# Patient Record
Sex: FEMALE | URBAN - NONMETROPOLITAN AREA
[De-identification: names, ages, dates, MRNs, and addresses within clinical notes are randomized per-mention and may not be internally consistent; named-entity substitution may affect disease eponyms.]

---

## 2019-09-20 ENCOUNTER — DOCTOR'S OFFICE (OUTPATIENT)
Dept: URBAN - NONMETROPOLITAN AREA CLINIC 1 | Facility: CLINIC | Age: 44
Setting detail: OPHTHALMOLOGY
End: 2019-09-20
Payer: COMMERCIAL

## 2019-09-20 ENCOUNTER — OPTICAL OFFICE (OUTPATIENT)
Dept: URBAN - NONMETROPOLITAN AREA CLINIC 4 | Facility: CLINIC | Age: 44
Setting detail: OPHTHALMOLOGY
End: 2019-09-20
Payer: COMMERCIAL

## 2019-09-20 DIAGNOSIS — H52.4: ICD-10-CM

## 2019-09-20 DIAGNOSIS — H52.223: ICD-10-CM

## 2019-09-20 PROBLEM — Z01.00 GOOD OCULAR HEALTH OU: Status: ACTIVE | Noted: 2019-09-20

## 2019-09-20 PROCEDURE — 92004 COMPRE OPH EXAM NEW PT 1/>: CPT | Performed by: OPTOMETRIST

## 2019-09-20 PROCEDURE — 92310 CONTACT LENS FITTING OU: CPT | Performed by: OPTOMETRIST

## 2019-09-20 PROCEDURE — V2781 PROGRESSIVE LENS PER LENS: HCPCS | Performed by: OPTOMETRIST

## 2019-09-20 PROCEDURE — V2799 MISC VISION ITEM OR SERVICE: HCPCS | Performed by: OPTOMETRIST

## 2019-09-20 PROCEDURE — V2025 EYEGLASSES DELUX FRAMES: HCPCS | Performed by: OPTOMETRIST

## 2019-09-20 PROCEDURE — V2784 LENS POLYCARB OR EQUAL: HCPCS | Performed by: OPTOMETRIST

## 2019-09-20 PROCEDURE — V2203 LENS SPHCYL BIFOCAL 4.00D/.1: HCPCS | Performed by: OPTOMETRIST

## 2019-09-20 PROCEDURE — V2750 ANTI-REFLECTIVE COATING: HCPCS | Performed by: OPTOMETRIST

## 2019-09-20 PROCEDURE — V2020 VISION SVCS FRAMES PURCHASES: HCPCS | Performed by: OPTOMETRIST

## 2019-09-20 ASSESSMENT — REFRACTION_MANIFEST
OS_VA1: 20/20
OD_AXIS: 170
OS_ADD: +1.50
OD_VA2: 20/20
OS_VA1: 20/
OD_ADD: +1.50
OS_VA2: 20/
OD_VA2: 20/
OS_VA3: 20/
OD_VA1: 20/
OD_VA3: 20/
OS_SPHERE: -0.50
OD_VA3: 20/
OS_AXIS: 005
OU_VA: 20/
OS_VA2: 20/20
OU_VA: 20/
OD_VA1: 20/20
OS_VA3: 20/
OD_CYLINDER: -0.75
OS_CYLINDER: -0.75
OD_SPHERE: -0.50

## 2019-09-20 ASSESSMENT — CONFRONTATIONAL VISUAL FIELD TEST (CVF)
OD_FINDINGS: FULL
OS_FINDINGS: FULL

## 2019-09-20 ASSESSMENT — VISUAL ACUITY
OD_BCVA: 20/20-1
OS_BCVA: 20/20-2

## 2019-09-20 ASSESSMENT — REFRACTION_AUTOREFRACTION
OD_AXIS: 171
OD_CYLINDER: -0.75
OS_AXIS: 0.00
OS_SPHERE: -0.50
OS_CYLINDER: 0.00
OD_SPHERE: -0.50

## 2019-09-20 ASSESSMENT — REFRACTION_CURRENTRX
OS_AXIS: 168
OD_OVR_VA: 20/
OS_OVR_VA: 20/
OD_VPRISM_DIRECTION: SV
OS_SPHERE: -0.25
OD_OVR_VA: 20/
OD_CYLINDER: -0.50
OS_OVR_VA: 20/
OS_OVR_VA: 20/
OS_VPRISM_DIRECTION: SV
OD_AXIS: 001
OD_OVR_VA: 20/
OD_SPHERE: -0.25
OS_CYLINDER: -0.50

## 2019-09-20 ASSESSMENT — SPHEQUIV_DERIVED
OD_SPHEQUIV: -0.875
OD_SPHEQUIV: -0.875
OS_SPHEQUIV: -0.5
OS_SPHEQUIV: -0.875

## 2021-07-21 ENCOUNTER — HOSPITAL ENCOUNTER (EMERGENCY)
Facility: HOSPITAL | Age: 46
Discharge: HOME | End: 2021-07-21
Attending: EMERGENCY MEDICINE
Payer: COMMERCIAL

## 2021-07-21 ENCOUNTER — APPOINTMENT (EMERGENCY)
Dept: CT IMAGING | Facility: HOSPITAL | Age: 46
End: 2021-07-21
Payer: COMMERCIAL

## 2021-07-21 VITALS
HEIGHT: 65 IN | BODY MASS INDEX: 22.49 KG/M2 | SYSTOLIC BLOOD PRESSURE: 107 MMHG | WEIGHT: 135 LBS | TEMPERATURE: 97.5 F | HEART RATE: 64 BPM | RESPIRATION RATE: 16 BRPM | OXYGEN SATURATION: 100 % | DIASTOLIC BLOOD PRESSURE: 57 MMHG

## 2021-07-21 DIAGNOSIS — M62.830 MUSCLE SPASM OF BACK: Primary | ICD-10-CM

## 2021-07-21 DIAGNOSIS — N39.0 UTI (URINARY TRACT INFECTION): ICD-10-CM

## 2021-07-21 LAB
ALBUMIN SERPL BCP-MCNC: 4 G/DL (ref 3.5–5)
ALP SERPL-CCNC: 72 U/L (ref 46–116)
ALT SERPL W P-5'-P-CCNC: 26 U/L (ref 12–78)
ANION GAP SERPL CALCULATED.3IONS-SCNC: 8 MMOL/L (ref 4–13)
AST SERPL W P-5'-P-CCNC: 19 U/L (ref 5–45)
BACTERIA UR QL AUTO: ABNORMAL /HPF
BASOPHILS # BLD AUTO: 0.07 THOUSANDS/ΜL (ref 0–0.1)
BASOPHILS NFR BLD AUTO: 1 % (ref 0–1)
BILIRUB SERPL-MCNC: 0.39 MG/DL (ref 0.2–1)
BILIRUB UR QL STRIP: NEGATIVE
BUN SERPL-MCNC: 13 MG/DL (ref 5–25)
CALCIUM SERPL-MCNC: 8.9 MG/DL (ref 8.3–10.1)
CHLORIDE SERPL-SCNC: 106 MMOL/L (ref 100–108)
CLARITY UR: CLEAR
CO2 SERPL-SCNC: 28 MMOL/L (ref 21–32)
COLOR UR: YELLOW
CREAT SERPL-MCNC: 0.75 MG/DL (ref 0.6–1.3)
EOSINOPHIL # BLD AUTO: 0.4 THOUSAND/ΜL (ref 0–0.61)
EOSINOPHIL NFR BLD AUTO: 5 % (ref 0–6)
ERYTHROCYTE [DISTWIDTH] IN BLOOD BY AUTOMATED COUNT: 12.5 % (ref 11.6–15.1)
GFR SERPL CREATININE-BSD FRML MDRD: 96 ML/MIN/1.73SQ M
GLUCOSE SERPL-MCNC: 90 MG/DL (ref 65–140)
GLUCOSE UR STRIP-MCNC: NEGATIVE MG/DL
HCT VFR BLD AUTO: 42.9 % (ref 34.8–46.1)
HGB BLD-MCNC: 14.1 G/DL (ref 11.5–15.4)
HGB UR QL STRIP.AUTO: NEGATIVE
IMM GRANULOCYTES # BLD AUTO: 0.04 THOUSAND/UL (ref 0–0.2)
IMM GRANULOCYTES NFR BLD AUTO: 1 % (ref 0–2)
KETONES UR STRIP-MCNC: NEGATIVE MG/DL
LEUKOCYTE ESTERASE UR QL STRIP: ABNORMAL
LIPASE SERPL-CCNC: 165 U/L (ref 73–393)
LYMPHOCYTES # BLD AUTO: 2.38 THOUSANDS/ΜL (ref 0.6–4.47)
LYMPHOCYTES NFR BLD AUTO: 27 % (ref 14–44)
MCH RBC QN AUTO: 31.7 PG (ref 26.8–34.3)
MCHC RBC AUTO-ENTMCNC: 32.9 G/DL (ref 31.4–37.4)
MCV RBC AUTO: 96 FL (ref 82–98)
MONOCYTES # BLD AUTO: 0.58 THOUSAND/ΜL (ref 0.17–1.22)
MONOCYTES NFR BLD AUTO: 7 % (ref 4–12)
NEUTROPHILS # BLD AUTO: 5.34 THOUSANDS/ΜL (ref 1.85–7.62)
NEUTS SEG NFR BLD AUTO: 59 % (ref 43–75)
NITRITE UR QL STRIP: NEGATIVE
NON-SQ EPI CELLS URNS QL MICRO: ABNORMAL /HPF
NRBC BLD AUTO-RTO: 0 /100 WBCS
PH UR STRIP.AUTO: 5.5 [PH]
PLATELET # BLD AUTO: 243 THOUSANDS/UL (ref 149–390)
PMV BLD AUTO: 10.2 FL (ref 8.9–12.7)
POTASSIUM SERPL-SCNC: 4.3 MMOL/L (ref 3.5–5.3)
PROT SERPL-MCNC: 7.3 G/DL (ref 6.4–8.2)
PROT UR STRIP-MCNC: NEGATIVE MG/DL
RBC # BLD AUTO: 4.45 MILLION/UL (ref 3.81–5.12)
RBC #/AREA URNS AUTO: ABNORMAL /HPF
SODIUM SERPL-SCNC: 142 MMOL/L (ref 136–145)
SP GR UR STRIP.AUTO: 1.02 (ref 1–1.03)
UROBILINOGEN UR QL STRIP.AUTO: 0.2 E.U./DL
WBC # BLD AUTO: 8.81 THOUSAND/UL (ref 4.31–10.16)
WBC #/AREA URNS AUTO: ABNORMAL /HPF

## 2021-07-21 PROCEDURE — 96375 TX/PRO/DX INJ NEW DRUG ADDON: CPT

## 2021-07-21 PROCEDURE — 81001 URINALYSIS AUTO W/SCOPE: CPT | Performed by: EMERGENCY MEDICINE

## 2021-07-21 PROCEDURE — 36415 COLL VENOUS BLD VENIPUNCTURE: CPT | Performed by: EMERGENCY MEDICINE

## 2021-07-21 PROCEDURE — 74176 CT ABD & PELVIS W/O CONTRAST: CPT

## 2021-07-21 PROCEDURE — 80053 COMPREHEN METABOLIC PANEL: CPT | Performed by: EMERGENCY MEDICINE

## 2021-07-21 PROCEDURE — 83690 ASSAY OF LIPASE: CPT | Performed by: EMERGENCY MEDICINE

## 2021-07-21 PROCEDURE — 99284 EMERGENCY DEPT VISIT MOD MDM: CPT | Performed by: EMERGENCY MEDICINE

## 2021-07-21 PROCEDURE — 99284 EMERGENCY DEPT VISIT MOD MDM: CPT

## 2021-07-21 PROCEDURE — 96374 THER/PROPH/DIAG INJ IV PUSH: CPT

## 2021-07-21 PROCEDURE — 85025 COMPLETE CBC W/AUTO DIFF WBC: CPT | Performed by: EMERGENCY MEDICINE

## 2021-07-21 RX ORDER — NAPROXEN 500 MG/1
500 TABLET ORAL 2 TIMES DAILY PRN
Qty: 30 TABLET | Refills: 0 | Status: SHIPPED | OUTPATIENT
Start: 2021-07-21

## 2021-07-21 RX ORDER — CYCLOBENZAPRINE HCL 10 MG
10 TABLET ORAL 3 TIMES DAILY PRN
Qty: 20 TABLET | Refills: 0 | Status: SHIPPED | OUTPATIENT
Start: 2021-07-21

## 2021-07-21 RX ORDER — OXYCODONE HYDROCHLORIDE AND ACETAMINOPHEN 5; 325 MG/1; MG/1
1 TABLET ORAL EVERY 8 HOURS PRN
Qty: 10 TABLET | Refills: 0 | Status: SHIPPED | OUTPATIENT
Start: 2021-07-21 | End: 2021-07-31

## 2021-07-21 RX ORDER — NITROFURANTOIN 25; 75 MG/1; MG/1
100 CAPSULE ORAL 2 TIMES DAILY
Qty: 10 CAPSULE | Refills: 0 | Status: SHIPPED | OUTPATIENT
Start: 2021-07-21

## 2021-07-21 RX ORDER — KETOROLAC TROMETHAMINE 30 MG/ML
15 INJECTION, SOLUTION INTRAMUSCULAR; INTRAVENOUS ONCE
Status: COMPLETED | OUTPATIENT
Start: 2021-07-21 | End: 2021-07-21

## 2021-07-21 RX ADMIN — KETOROLAC TROMETHAMINE 15 MG: 30 INJECTION, SOLUTION INTRAMUSCULAR at 09:16

## 2021-07-21 RX ADMIN — MORPHINE SULFATE 2 MG: 2 INJECTION, SOLUTION INTRAMUSCULAR; INTRAVENOUS at 09:15

## 2021-07-21 NOTE — ED NOTES
Patient transported to [facility], [address] [facility]  07/21/21 1048

## 2021-07-21 NOTE — ED PROVIDER NOTES
History  Chief Complaint   Patient presents with    Back Pain     Right sided low back pain, started off and on for past month and become increasingly worse past 2 weeks and became unbearable past 3 days, occasional "catch her breath and feels a pain in chest when taking that breath" doesn't remember doing anything to cause muskuloskeletal injury, denies any urinary symptoms and denies radiating down legs     Patient is a pleasant 71-year-old female who presents emergency room in acute distress  She is complaining of right-sided back pain  Patient reports the back pain began about 1 month ago and has gotten progressively worse  She reports that she cannot recall any specific action or behavior that she was doing when the back pain started  Reports that initially it was mild in nature and would come and go  Over the last week or so however, it has become more severe until the last 3 days which she describes it as unbearable    She reports that it makes it difficult to catch her breath  She reports that movement hurts  She has had some mild nausea without any vomiting  She has had no actual shortness of breath  Patient does have a remote history of kidney stones  She denies any history of thoracic lumbar related spine issues  She denies any falls  Patient reports that she is unable to get comfortable at this time                History provided by:  Spouse and patient  Back Pain  Location:  Thoracic spine (Right flank)  Quality:  Aching, cramping, shooting and stabbing  Radiates to:  Does not radiate  Pain severity:  Severe  Pain is:  Same all the time  Onset quality:  Gradual  Timing:  Constant  Progression:  Worsening  Chronicity:  New  Context: not emotional stress, not falling, not jumping from heights, not lifting heavy objects, not MCA, not MVA, not occupational injury, not recent illness, not recent injury and not twisting    Relieved by:  Nothing  Worsened by:  Palpation, touching, lying down, sitting, coughing, movement and deep breathing  Associated symptoms: no abdominal pain, no bladder incontinence, no bowel incontinence, no dysuria, no fever, no leg pain and no numbness    Risk factors: no hx of cancer and no hx of osteoporosis        Prior to Admission Medications   Prescriptions Last Dose Informant Patient Reported? Taking? Multiple Vitamins-Minerals (Ultra Womens Pack) MISC 7/21/2021 at Unknown time  Yes Yes   Sig: Take by mouth      Facility-Administered Medications: None       Past Medical History:   Diagnosis Date    Kidney stone        Past Surgical History:   Procedure Laterality Date    APPENDECTOMY      CYSTOSCOPY W/ LASER LITHOTRIPSY         History reviewed  No pertinent family history  I have reviewed and agree with the history as documented  E-Cigarette/Vaping    E-Cigarette Use Never User      E-Cigarette/Vaping Substances    Nicotine No     THC No     CBD No     Flavoring No     Other No     Unknown No      Social History     Tobacco Use    Smoking status: Current Every Day Smoker     Packs/day: 1 00     Types: Cigarettes    Smokeless tobacco: Never Used   Vaping Use    Vaping Use: Never used   Substance Use Topics    Alcohol use: Yes     Comment: socially    Drug use: Never       Review of Systems   Constitutional: Negative for fever  HENT: Negative  Eyes: Negative  Respiratory: Negative  Cardiovascular: Negative  Gastrointestinal: Positive for nausea  Negative for abdominal pain, bowel incontinence, diarrhea and vomiting  Endocrine: Negative  Negative for polyuria  Genitourinary: Negative for bladder incontinence, dysuria, flank pain, frequency and urgency  Musculoskeletal: Positive for back pain  Negative for arthralgias and myalgias  Skin: Negative  Negative for pallor and rash  Allergic/Immunologic: Negative  Neurological: Negative for numbness  Hematological: Negative  Psychiatric/Behavioral: Negative      All other systems reviewed and are negative  Physical Exam  Physical Exam  Vitals and nursing note reviewed  Constitutional:       General: She is in acute distress  Abdominal:      Palpations: There is no mass  Tenderness: There is abdominal tenderness  There is right CVA tenderness  There is no left CVA tenderness or rebound  Hernia: No hernia is present  Musculoskeletal:      Cervical back: Normal       Thoracic back: Normal       Lumbar back: Normal    Psychiatric:         Mood and Affect: Mood normal          Thought Content:  Thought content normal          Judgment: Judgment normal          Vital Signs  ED Triage Vitals [07/21/21 0824]   Temperature Pulse Respirations Blood Pressure SpO2   98 3 °F (36 8 °C) 80 18 118/67 100 %      Temp Source Heart Rate Source Patient Position - Orthostatic VS BP Location FiO2 (%)   Temporal Monitor Lying Left arm --      Pain Score       7           Vitals:    07/21/21 0824 07/21/21 0830 07/21/21 0845 07/21/21 0930   BP: 118/67 99/76 98/62 110/74   Pulse: 80 69 75 73   Patient Position - Orthostatic VS: Lying Lying Lying Lying         Visual Acuity      ED Medications  Medications   morphine injection 2 mg (2 mg Intravenous Given 7/21/21 0915)   ketorolac (TORADOL) injection 15 mg (15 mg Intravenous Given 7/21/21 0916)       Diagnostic Studies  Results Reviewed     Procedure Component Value Units Date/Time    Urine Microscopic [[id number]]  (Abnormal) Collected: 07/21/21 0914    Lab Status: Final result Specimen: Urine, Clean Catch Updated: 07/21/21 1009     RBC, UA None Seen /hpf      WBC, UA 1-2 /hpf      Epithelial Cells Occasional /hpf      Bacteria, UA Moderate /hpf     UA w Reflex to Microscopic w Reflex to Culture [[id number]]  (Abnormal) Collected: 07/21/21 0914    Lab Status: Final result Specimen: Urine, Clean Catch Updated: 07/21/21 0954     Color, UA Yellow     Clarity, UA Clear     Specific [place], UA 1 020     pH, UA 5 5     Leukocytes, UA Trace     Nitrite, UA Negative     Protein, UA Negative mg/dl      Glucose, UA Negative mg/dl      Ketones, UA Negative mg/dl      Urobilinogen, UA 0 2 E U /dl      Bilirubin, UA Negative     Blood, UA Negative    Comprehensive metabolic panel [[id number]] Collected: 07/21/21 0912    Lab Status: Final result Specimen: Blood from Arm, Left Updated: 07/21/21 0934     Sodium 142 mmol/L      Potassium 4 3 mmol/L      Chloride 106 mmol/L      CO2 28 mmol/L      ANION GAP 8 mmol/L      BUN 13 mg/dL      Creatinine 0 75 mg/dL      Glucose 90 mg/dL      Calcium 8 9 mg/dL      AST 19 U/L      ALT 26 U/L      Alkaline Phosphatase 72 U/L      Total Protein 7 3 g/dL      Albumin 4 0 g/dL      Total Bilirubin 0 39 mg/dL      eGFR 96 ml/min/1 73sq m     Narrative:      National Kidney Disease Foundation guidelines for Chronic Kidney Disease (CKD):     Stage 1 with normal or high GFR (GFR > 90 mL/min/1 73 square meters)    Stage 2 Mild CKD (GFR = 60-89 mL/min/1 73 square meters)    Stage 3A Moderate CKD (GFR = 45-59 mL/min/1 73 square meters)    Stage 3B Moderate CKD (GFR = 30-44 mL/min/1 73 square meters)    Stage 4 Severe CKD (GFR = 15-29 mL/min/1 73 square meters)    Stage 5 End Stage CKD (GFR <15 mL/min/1 73 square meters)  Note: GFR calculation is accurate only with a steady state creatinine    Lipase [[id number]]  (Normal) Collected: 07/21/21 0912    Lab Status: Final result Specimen: Blood from Arm, Left Updated: 07/21/21 0934     Lipase 165 u/L     CBC and differential [[id number]] Collected: 07/21/21 0912    Lab Status: Final result Specimen: Blood from Arm, Left Updated: 07/21/21 0917     WBC 8 81 Thousand/uL      RBC 4 45 Million/uL      Hemoglobin 14 1 g/dL      Hematocrit 42 9 %      MCV 96 fL      MCH 31 7 pg      MCHC 32 9 g/dL      RDW 12 5 %      MPV 10 2 fL      Platelets 343 Thousands/uL      nRBC 0 /100 WBCs      Neutrophils Relative 59 %      Immat GRANS % 1 %      Lymphocytes Relative 27 %      Monocytes Relative 7 % Eosinophils Relative 5 %      Basophils Relative 1 %      Neutrophils Absolute 5 34 Thousands/µL      Immature Grans Absolute 0 04 Thousand/uL      Lymphocytes Absolute 2 38 Thousands/µL      Monocytes Absolute 0 58 Thousand/µL      Eosinophils Absolute 0 40 Thousand/µL      Basophils Absolute 0 07 Thousands/µL                  CT renal stone study abdomen pelvis wo contrast   Final Result by Joel Rollins MD (07/21 1031)      No kidney stones are seen  No hydronephrosis  Workstation performed: [id number]                    Procedures  Procedures         ED Course  ED Course as of Jul 21 1108   Wed Jul 21, 2021   1015 Blood, UA: Negative   1015 Epithelial Cells: Occasional   1015 Bacteria, UA(!): Moderate   1048 Patient with no evidence of kidney stone  Patient with no evidence of the urinary tract infection on imaging studies (stranding or evidence of cystitis)  1103 Discussed findings with the patient at bedside  Have elected to start antibiotics out of an abundance of caution however did advise patient that her pain is not related to a urinary issue  This is an incidental finding  This is possibly contaminant as well  Given that we have essentially ruled out any other significant pathology on the CT today, will start the patient on muscle relaxants and pain medications  Have advised her to follow-up with her PCP with regards to her pain as she may need physical therapy intervention and/or additional testing  SBIRT 20yo+      Most Recent Value   SBIRT (24 yo +)   In order to provide better care to our patients, we are screening all of our patients for alcohol and drug use  Would it be okay to ask you these screening questions? Yes Filed at: 07/21/2021 0831   Initial Alcohol Screen: US AUDIT-C    1  How often do you have a drink containing alcohol?  0 Filed at: 07/21/2021 0831   2   How many drinks containing alcohol do you have on a typical day you are drinking? 0 Filed at: 07/21/2021 0831   3a  Male UNDER 65: How often do you have five or more drinks on one occasion? 0 Filed at: 07/21/2021 0831   3b  FEMALE Any Age, or MALE 65+: How often do you have 4 or more drinks on one occassion? 0 Filed at: 07/21/2021 0831   Audit-C Score  0 Filed at: 07/21/2021 3474   JOSELINE: How many times in the past year have you    Used an illegal drug or used a prescription medication for non-medical reasons? Never Filed at: 07/21/2021 0831                    MDM    Disposition  Final diagnoses:   Muscle spasm of back   UTI (urinary tract infection)     Time reflects when diagnosis was documented in both MDM as applicable and the Disposition within this note     Time User Action Codes Description Comment    7/21/2021 11:04 AM Micaela Hiss Add [X51 149] Muscle spasm of back     7/21/2021 11:04 AM Micaela Hiss Add [N39 0] UTI (urinary tract infection)       ED Disposition     ED Disposition Condition Date/Time Comment    Discharge Stable Wed Jul 21, 2021 11:02 AM [facility] discharge to home/self care              Follow-up Information     Follow up With Specialties Details Why Contact Info    Gege Beckham, DO Family Medicine In 1 week Even in well [place] [address]  [phone]            Patient's Medications   Discharge Prescriptions    CYCLOBENZAPRINE (FLEXERIL) 10 MG TABLET    Take 1 tablet (10 mg total) by mouth 3 (three) times a day as needed for muscle spasms       Start Date: 7/21/2021 End Date: --       Order Dose: 10 mg       Quantity: 20 tablet    Refills: 0    NAPROXEN (NAPROSYN) 500 MG TABLET    Take 1 tablet (500 mg total) by mouth 2 (two) times a day as needed for mild pain or moderate pain       Start Date: 7/21/2021 End Date: --       Order Dose: 500 mg       Quantity: 30 tablet    Refills: 0    NITROFURANTOIN (MACROBID) 100 MG CAPSULE    Take 1 capsule (100 mg total) by mouth 2 (two) times a day       Start Date: 7/21/2021 End Date: --       Order Dose: 100 mg       Quantity: 10 capsule    Refills: 0    OXYCODONE-ACETAMINOPHEN (PERCOCET) 5-325 MG PER TABLET    Take 1 tablet by mouth every 8 (eight) hours as needed for moderate pain for up to 10 daysMax Daily Amount: 3 tablets       Start Date: 7/21/2021 End Date: 7/31/2021       Order Dose: 1 tablet       Quantity: 10 tablet    Refills: 0     No discharge procedures on file      PDMP Review     None          ED Provider  Electronically Signed by           Daryle Martins, DO  07/21/21 1102

## 2021-07-21 NOTE — DISCHARGE INSTRUCTIONS
Take medications as prescribed  Please be mindful that we feel it is unlikely that your pain is being caused by urinary tract infection  It appears that this is likely related to muscle spasm  We recommend the do follow-up with your primary care provider as they may elect to refer you to physical therapy  Return with any worsening

## 2023-08-07 ENCOUNTER — OFFICE VISIT (OUTPATIENT)
Dept: URGENT CARE | Facility: CLINIC | Age: 48
End: 2023-08-07
Payer: COMMERCIAL

## 2023-08-07 VITALS
DIASTOLIC BLOOD PRESSURE: 68 MMHG | TEMPERATURE: 99.3 F | WEIGHT: 145 LBS | OXYGEN SATURATION: 99 % | BODY MASS INDEX: 24.16 KG/M2 | SYSTOLIC BLOOD PRESSURE: 108 MMHG | RESPIRATION RATE: 18 BRPM | HEART RATE: 68 BPM | HEIGHT: 65 IN

## 2023-08-07 DIAGNOSIS — H61.22 IMPACTED CERUMEN OF LEFT EAR: Primary | ICD-10-CM

## 2023-08-07 PROCEDURE — 69209 REMOVE IMPACTED EAR WAX UNI: CPT

## 2023-08-07 PROCEDURE — 99213 OFFICE O/P EST LOW 20 MIN: CPT

## 2023-08-07 NOTE — PROGRESS NOTES
[facility]        NAME: Lon Steiner is a 49 y.o. female  : 1975    MRN: [id number]  DATE: 2023  TIME: 3:02 PM    Assessment and Plan   Impacted cerumen of left ear [H61.22]  1. Impacted cerumen of left ear  Ear cerumen removal        Ear cerumen removal    Date/Time: 2023 3:01 PM    Performed by: JYOTI Ashby  Authorized by: Ray Carmona MD  Universal Protocol:  Consent: Verbal consent obtained. Risks and benefits: risks, benefits and alternatives were discussed  Consent given by: patient  Patient understanding: patient states understanding of the procedure being performed  Patient identity confirmed: verbally with patient      Patient location:  Clinic  Procedure details:     Location:  L ear    Procedure type: irrigation only    Post-procedure details:     Complication:  None    Hearing quality:  Improved    Patient tolerance of procedure: Tolerated well, no immediate complications    Cerumen removed with irrigation/lavage, tolerated well. TM intact and non-erythematous. Encouraged continued supportive measures. Follow up with PCP in 3-5 days or proceed to emergency department for worsening symptoms. Patient verbalized understanding of instructions given. Patient Instructions     Patient Instructions     Cerumen removed  Do not insert objects or q-tips into the ears   Follow up with PCP in 3-5 days. Proceed to  ER if symptoms worsen. Earwax Blockage   AMBULATORY CARE:   Earwax  can build up in your ear canal and cause a blockage. Earwax blockage happens when your ear makes earwax faster than your body can remove it. Common symptoms include the following:   • Trouble hearing    • Earache    • Ear fullness or a feeling that something is plugging up your ear    • Itching or ringing in your ear    • Dizziness    Seed immediate care if:   • You feel dizzy. • You have discharge or blood coming out of your ear.     • Your ear pain does not go away or gets worse. Call your doctor if:   • You have a fever. • You have trouble hearing or hear ringing noises. • You have questions or concerns about your condition or care. Treatment for earwax blockage:   • Medicines  placed in the ear canal can soften the earwax so it will come out. • Flushing your ear canal  with warm water may flush out the earwax. • Small medical tools  may be used to remove the earwax. How to prevent earwax blockage:  Do not stick anything into your ears to clean them. Use cotton swabs on the outside of your ear only. Ask your healthcare provider for more information on ways to prevent blockage. Follow up with your doctor as directed:  Write down your questions so you remember to ask them during your visits. © Copyright Radha Snowden 2022 Information is for End User's use only and may not be sold, redistributed or otherwise used for commercial purposes. The above information is an  only. It is not intended as medical advice for individual conditions or treatments. Talk to your doctor, nurse or pharmacist before following any medical regimen to see if it is safe and effective for you. Chief Complaint     Chief Complaint   Patient presents with   • Ear Problem     Left ear blockage starting last night; loss of hearing due to blockage         History of Present Illness       79-year-old female with no significant past medical history presents with complaints of left-sided ear wax blockage and muffled hearing x2 days. Patient reports using an over-the-counter ear solution, which worsened symptoms and now she no longer has hearing. Denies fever, chills, drainage, or URI symptoms. Review of Systems   Review of Systems   Constitutional: Negative for chills and fever. HENT: Positive for hearing loss. Negative for congestion, ear discharge, ear pain, rhinorrhea, sore throat, trouble swallowing and voice change. Eyes: Negative for discharge. Respiratory: Negative for cough and shortness of breath. Cardiovascular: Negative for chest pain. Gastrointestinal: Negative for abdominal pain, diarrhea, nausea and vomiting. Musculoskeletal: Negative for myalgias. Skin: Negative for rash. Neurological: Negative for dizziness and headaches. Current Medications       Current Outpatient Medications:   •  cyclobenzaprine (FLEXERIL) 10 mg tablet, Take 1 tablet (10 mg total) by mouth 3 (three) times a day as needed for muscle spasms (Patient not taking: Reported on 8/7/2023), Disp: 20 tablet, Rfl: 0  •  Multiple Vitamins-Minerals (Ultra Womens Pack) MISC, Take by mouth (Patient not taking: Reported on 8/7/2023), Disp: , Rfl:   •  naproxen (NAPROSYN) 500 mg tablet, Take 1 tablet (500 mg total) by mouth 2 (two) times a day as needed for mild pain or moderate pain (Patient not taking: Reported on 8/7/2023), Disp: 30 tablet, Rfl: 0  •  nitrofurantoin (MACROBID) 100 mg capsule, Take 1 capsule (100 mg total) by mouth 2 (two) times a day (Patient not taking: Reported on 8/7/2023), Disp: 10 capsule, Rfl: 0    Current Allergies     Allergies as of 08/07/2023 - Reviewed 08/07/2023   Allergen Reaction Noted   • Procaine Other (See Comments) 02/08/2017            The following portions of the patient's history were reviewed and updated as appropriate: allergies, current medications, past family history, past medical history, past social history, past surgical history and problem list.     Past Medical History:   Diagnosis Date   • Kidney stone        Past Surgical History:   Procedure Laterality Date   • APPENDECTOMY     • CYSTOSCOPY W/ LASER LITHOTRIPSY         Family History   Problem Relation Age of Onset   • Hypertension Mother    • Cancer Father          Medications have been verified.         Objective   /68   Pulse 68   Temp 99.3 °F (37.4 °C)   Resp 18   Ht 5' 5" (1.651 m)   Wt 65.8 kg (145 lb)   LMP 08/01/2023   SpO2 99%   BMI 24.13 kg/m² Patient's last menstrual period was 08/01/2023. Physical Exam     Physical Exam  Vitals and nursing note reviewed. Constitutional:       General: She is not in acute distress. Appearance: She is not toxic-appearing. HENT:      Head: Normocephalic. Right Ear: Tympanic membrane, ear canal and external ear normal.      Left Ear: Ear canal and external ear normal. There is impacted cerumen. Nose: Nose normal.      Mouth/Throat:      Mouth: Mucous membranes are moist.      Pharynx: Oropharynx is clear. Eyes:      Conjunctiva/sclera: Conjunctivae normal.   Pulmonary:      Effort: Pulmonary effort is normal.   Lymphadenopathy:      Cervical: No cervical adenopathy. Skin:     General: Skin is warm and dry. Neurological:      Mental Status: She is alert and oriented to person, place, and time. Gait: Gait is intact.    Psychiatric:         Mood and Affect: Mood normal.         Behavior: Behavior normal.

## 2023-08-07 NOTE — PATIENT INSTRUCTIONS
Cerumen removed  Do not insert objects or q-tips into the ears   Follow up with PCP in 3-5 days. Proceed to  ER if symptoms worsen. Earwax Blockage   AMBULATORY CARE:   Earwax  can build up in your ear canal and cause a blockage. Earwax blockage happens when your ear makes earwax faster than your body can remove it. Common symptoms include the following:   Trouble hearing    Earache    Ear fullness or a feeling that something is plugging up your ear    Itching or ringing in your ear    Dizziness    Seed immediate care if:   You feel dizzy. You have discharge or blood coming out of your ear. Your ear pain does not go away or gets worse. Call your doctor if:   You have a fever. You have trouble hearing or hear ringing noises. You have questions or concerns about your condition or care. Treatment for earwax blockage:   Medicines  placed in the ear canal can soften the earwax so it will come out. Flushing your ear canal  with warm water may flush out the earwax. Small medical tools  may be used to remove the earwax. How to prevent earwax blockage:  Do not stick anything into your ears to clean them. Use cotton swabs on the outside of your ear only. Ask your healthcare provider for more information on ways to prevent blockage. Follow up with your doctor as directed:  Write down your questions so you remember to ask them during your visits. © Copyright Arcadio Hunt 2022 Information is for End User's use only and may not be sold, redistributed or otherwise used for commercial purposes. The above information is an  only. It is not intended as medical advice for individual conditions or treatments. Talk to your doctor, nurse or pharmacist before following any medical regimen to see if it is safe and effective for you.